# Patient Record
Sex: MALE | Race: WHITE | NOT HISPANIC OR LATINO | Employment: FULL TIME | URBAN - NONMETROPOLITAN AREA
[De-identification: names, ages, dates, MRNs, and addresses within clinical notes are randomized per-mention and may not be internally consistent; named-entity substitution may affect disease eponyms.]

---

## 2019-06-07 ENCOUNTER — OFFICE VISIT (OUTPATIENT)
Dept: INTERNAL MEDICINE | Facility: CLINIC | Age: 29
End: 2019-06-07

## 2019-06-07 VITALS
HEART RATE: 60 BPM | HEIGHT: 69 IN | DIASTOLIC BLOOD PRESSURE: 72 MMHG | WEIGHT: 220 LBS | SYSTOLIC BLOOD PRESSURE: 120 MMHG | BODY MASS INDEX: 32.58 KG/M2 | OXYGEN SATURATION: 98 % | TEMPERATURE: 97.5 F

## 2019-06-07 DIAGNOSIS — R53.82 CHRONIC FATIGUE: ICD-10-CM

## 2019-06-07 DIAGNOSIS — Z22.39 CARRIER OF UREAPLASMA UREALYTICUM: ICD-10-CM

## 2019-06-07 DIAGNOSIS — G47.30 SLEEP APNEA, UNSPECIFIED TYPE: ICD-10-CM

## 2019-06-07 DIAGNOSIS — Z76.89 ENCOUNTER TO ESTABLISH CARE: Primary | ICD-10-CM

## 2019-06-07 PROCEDURE — 99213 OFFICE O/P EST LOW 20 MIN: CPT | Performed by: PHYSICIAN ASSISTANT

## 2019-06-07 RX ORDER — DOXYCYCLINE HYCLATE 100 MG/1
100 CAPSULE ORAL 2 TIMES DAILY
Qty: 14 CAPSULE | Refills: 0 | Status: SHIPPED | OUTPATIENT
Start: 2019-06-07 | End: 2019-06-14

## 2019-06-07 NOTE — PROGRESS NOTES
Subjective   Dutch Mai is a 29 y.o. male who presents to [facility] care.    Chief Complaint   Patient presents with   • [facility]     with Elza today. Pt states he is always tired and his wife was recently diagnosed with Ureaplasma       HPI: Stays fatigued, ongoing for the last 3-4 years.  Works second shift.  Gets anywhere from 7-10 hours of sleep but wakes feeling fatigued.  Sometimes does not feel he sleeps well.  [facility] but he never began using the recommended CPAP.  He cut out soda 5-6 weeks ago and has reduced energy drinks which has not helped.   CBC, B12, vitamin D and thyroid have [facility] without any abnormalities to explain his fatigue. No adenopathy, night sweats or weight loss.     His wife was recently treated for ureaplasma and he is requesting treatment. He denies any burning with urination or penile discharge.         The following portions of the patient's history were reviewed and updated as appropriate: He  has no past medical history on file.  He does not have any pertinent problems on file.  He  has no past surgical history on file.  His family history includes Hypertension in his sister; Skin cancer in his mother.  He  reports that he has quit smoking. He has quit using smokeless tobacco. He reports that he drinks alcohol. He reports that he uses drugs. Drug: Marijuana.    Current Outpatient Medications   Medication Sig Dispense Refill   • doxycycline (VIBRAMYCIN) 100 MG capsule Take 1 capsule by mouth 2 (Two) Times a Day for 7 days. 14 capsule 0     No current facility-administered medications for this visit.        Review of Systems  Review of Systems   Constitutional: Positive for fatigue. Negative for activity change, appetite change, chills, fever and unexpected weight change.        No malaise   HENT: Negative for congestion, ear pain, hearing loss, nosebleeds, postnasal drip, rhinorrhea, sore throat,  "tinnitus, trouble swallowing and voice change.    Eyes: Negative for photophobia, pain, discharge, redness, itching and visual disturbance.        No dry eyes   Respiratory: Positive for apnea. Negative for cough, chest tightness, shortness of breath and wheezing.         No SOB with exertion  No SOB lying down   Cardiovascular: Negative for chest pain, palpitations and leg swelling.        No leg cramps   Gastrointestinal: Negative for abdominal distention, abdominal pain, blood in stool, constipation, diarrhea, nausea and vomiting.        No frequent heartburn  No change in bowel habits   Endocrine: Negative for cold intolerance, heat intolerance, polydipsia, polyphagia and polyuria.        No Muscle weakness  No feeling of weakness  No Hot flashes   Genitourinary: Negative.  Negative for decreased urine volume, difficulty urinating, discharge, dysuria, frequency, hematuria, penile pain, testicular pain and urgency.        No lump on testicles   Musculoskeletal: Negative for arthralgias, back pain, gait problem, joint swelling, myalgias, neck pain and neck stiffness.        No limb pain  No limb swelling   Skin: Negative for color change, pallor, rash and wound.        No rash  No lesions  No Itching  No change in mole   Allergic/Immunologic: Negative for environmental allergies, food allergies and immunocompromised state.   Neurological: Negative for dizziness, tremors, seizures, syncope, weakness, numbness and headaches.        No trouble walking  No confusion  No tingling       Hematological: Negative for adenopathy. Does not bruise/bleed easily.   Psychiatric/Behavioral: Positive for sleep disturbance. Negative for agitation, behavioral problems, confusion, dysphoric mood, hallucinations, self-injury and suicidal ideas. The patient is not nervous/anxious.         No change in personality         Objective    /72   Pulse 60   Temp 97.5 °F (36.4 °C)   Ht 175.3 cm (69\")   Wt 99.8 kg (220 lb)   SpO2 98% "   BMI 32.49 kg/m²   Physical Exam   Constitutional: He is oriented to person, place, and time. He appears well-developed and well-nourished. No distress.   HENT:   Head: Normocephalic and atraumatic.   Right Ear: External ear normal.   Left Ear: External ear normal.   Nose: Nose normal.   Mouth/Throat: No oropharyngeal exudate.   Crowded oropharynx with mildly enlarged right tonsil.    Eyes: Conjunctivae and EOM are normal. Pupils are equal, round, and reactive to light. No scleral icterus.   Neck: Normal range of motion. Neck supple. No thyromegaly present.   Cardiovascular: Normal rate, regular rhythm, normal heart sounds and intact distal pulses. Exam reveals no gallop and no friction rub.   No murmur heard.  Pulmonary/Chest: Effort normal and breath sounds normal. No stridor. No respiratory distress. He has no wheezes. He has no rales. He exhibits no tenderness.   Abdominal: Soft. Bowel sounds are normal. He exhibits no distension and no mass. There is no tenderness. There is no rebound and no guarding. No hernia.   Musculoskeletal: Normal range of motion. He exhibits no tenderness.   Lymphadenopathy:     He has no cervical adenopathy.   Neurological: He is alert and oriented to person, place, and time. He displays normal reflexes. No cranial nerve deficit or sensory deficit.   Skin: Skin is warm and dry. Capillary refill takes less than 2 seconds. No rash noted. He is not diaphoretic. No pallor.   Psychiatric: He has a normal mood and affect. His behavior is normal. Judgment and thought content normal.   Nursing note and vitals reviewed.        Assessment/Plan      Diagnosis Plan   1. Encounter to establish care     2. Chronic fatigue  He declined lab workup today since [facility] are [facility].      3. Carrier of ureaplasma urealyticum  doxycycline (VIBRAMYCIN) 100 MG capsule     4. Sleep apnea, unspecified type  Strongly [facility] for  sleep apnea as this is very likely to be the cause of his chronic fatigue.          Return if symptoms worsen or fail to improve.         AMELIA Malagon  06/07/2019  11:25 AM

## 2019-07-30 ENCOUNTER — OFFICE VISIT (OUTPATIENT)
Dept: INTERNAL MEDICINE | Facility: CLINIC | Age: 29
End: 2019-07-30

## 2019-07-30 VITALS
SYSTOLIC BLOOD PRESSURE: 110 MMHG | HEIGHT: 69 IN | BODY MASS INDEX: 31.84 KG/M2 | HEART RATE: 79 BPM | TEMPERATURE: 98.1 F | WEIGHT: 215 LBS | DIASTOLIC BLOOD PRESSURE: 60 MMHG | OXYGEN SATURATION: 99 %

## 2019-07-30 DIAGNOSIS — F90.8 ADHD, ADULT RESIDUAL TYPE: ICD-10-CM

## 2019-07-30 DIAGNOSIS — M67.431 GANGLION CYST OF WRIST, RIGHT: Primary | ICD-10-CM

## 2019-07-30 PROCEDURE — 99214 OFFICE O/P EST MOD 30 MIN: CPT | Performed by: PHYSICIAN ASSISTANT

## 2019-07-30 NOTE — PATIENT INSTRUCTIONS
Ganglion Cyst  A ganglion cyst is a non-cancerous, fluid-filled lump that occurs near a joint or tendon. The cyst grows out of a joint or the lining of a tendon. Ganglion cysts most often develop in the hand or wrist, but they can also develop in the shoulder, elbow, hip, knee, ankle, or foot.  Ganglion cysts are ball-shaped or egg-shaped. Their size can range from the size of a pea to larger than a grape. Increased activity may cause the cyst to get bigger because more fluid starts to build up.  What are the causes?  The exact cause of this condition is not known, but it may be related to:  · Inflammation or irritation around the joint.  · An injury.  · Repetitive movements or overuse.  · Arthritis.    What increases the risk?  You are more likely to develop this condition if:  · You are a woman.  · You are 15-40 years old.    What are the signs or symptoms?  The main symptom of this condition is a lump. It most often appears on the hand or wrist. In many cases, there are no other symptoms, but a cyst can sometimes cause:  · Tingling.  · Pain.  · Numbness.  · Muscle weakness.  · Weak .  · Less range of motion in a joint.    How is this diagnosed?  Ganglion cysts are usually diagnosed based on a physical exam. Your health care provider will feel the lump and may shine a light next to it. If it is a ganglion cyst, the light will likely shine through it.  Your health care provider may order an X-ray, ultrasound, or MRI to rule out other conditions.  How is this treated?  Ganglion cysts often go away on their own without treatment. If you have pain or other symptoms, treatment may be needed. Treatment is also needed if the ganglion cyst limits your movement or if it gets infected. Treatment may include:  · Wearing a brace or splint on your wrist or finger.  · Taking anti-inflammatory medicine.  · Having fluid drained from the lump with a needle (aspiration).  · Getting a steroid injected into the joint.  · Having  surgery to remove the ganglion cyst.  · Placing a pad on your shoe or wearing shoes that will not rub against the cyst if it is on your foot.    Follow these instructions at home:  · Do not press on the ganglion cyst, poke it with a needle, or hit it.  · Take over-the-counter and prescription medicines only as told by your health care provider.  · If you have a brace or splint:  ? Wear it as told by your health care provider.  ? Remove it as told by your health care provider. Ask if you need to remove it when you take a shower or a bath.  · Watch your ganglion cyst for any changes.  · Keep all follow-up visits as told by your health care provider. This is important.  Contact a health care provider if:  · Your ganglion cyst becomes larger or more painful.  · You have pus coming from the lump.  · You have weakness or numbness in the affected area.  · You have a fever or chills.  Get help right away if:  · You have a fever and have any of these in the cyst area:  ? Increased redness.  ? Red streaks.  ? Swelling.  Summary  · A ganglion cyst is a non-cancerous, fluid-filled lump that occurs near a joint or tendon.  · Ganglion cysts most often develop in the hand or wrist, but they can also develop in the shoulder, elbow, hip, knee, ankle, or foot.  · Ganglion cysts often go away on their own without treatment.  This information is not intended to replace advice given to you by your health care provider. Make sure you discuss any questions you have with your health care provider.  Document Released: 12/15/2001 Document Revised: 08/17/2018 Document Reviewed: 08/17/2018  [employer] Interactive Patient Education © 2019 [employer]

## 2019-07-30 NOTE — PROGRESS NOTES
Dutch Mai is a 29 y.o. male.     Subjective   History of Present Illness   Here today with concern of a bump on the right wrist which he first noticed Saturday when it was larger than it is today. The bump is minimally tender if he applies significant pressure, but otherwise is not bothersome. No appreciable popping sensation.  No known injury.  No previous similar occurrences.     He was prescribed Adderall in high school and feels he would benefit from it again. He has been having difficulty concentrating and staying on task at work. No SI, HI, dysphoric mood or sleep disturbances.           The following portions of the patient's history were reviewed and updated as appropriate: allergies, current medications, past family history, past medical history, past social history, past surgical history and problem list.    Review of Systems   Constitutional: Negative for appetite change, chills, fatigue, fever and unexpected weight change.   Respiratory: Negative for cough, chest tightness, shortness of breath and wheezing.    Cardiovascular: Negative for chest pain, palpitations and leg swelling.   Musculoskeletal: Positive for arthralgias. Negative for back pain, gait problem, joint swelling, myalgias, neck pain and neck stiffness.   Skin: Negative.    Hematological: Negative for adenopathy. Does not bruise/bleed easily.   Psychiatric/Behavioral: Positive for decreased concentration. Negative for agitation, confusion, dysphoric mood, hallucinations, self-injury, sleep disturbance and suicidal ideas. The patient is not nervous/anxious.          Objective    Physical Exam   Constitutional: He is oriented to person, place, and time. He appears well-developed and well-nourished. No distress.   HENT:   Head: Normocephalic and atraumatic.   Eyes: Conjunctivae and EOM are normal. Pupils are equal, round, and reactive to light.   Neck: Normal range of motion. Neck supple.   Cardiovascular: Normal rate, regular rhythm and  "normal heart sounds. Exam reveals no gallop and no friction rub.   No murmur heard.  Pulmonary/Chest: Effort normal and breath sounds normal. No respiratory distress. He has no wheezes. He has no rales.   Abdominal: Soft. Bowel sounds are normal. There is no tenderness.   Musculoskeletal: Normal range of motion. He exhibits deformity (2-3 mm width ganglion cyst of the right wrist). He exhibits no edema or tenderness.        Right wrist: He exhibits deformity. He exhibits normal range of motion, no tenderness, no bony tenderness, no swelling, no effusion, no crepitus and no laceration.        Arms:  Neurological: He is alert and oriented to person, place, and time. He displays normal reflexes. No cranial nerve deficit or sensory deficit. He exhibits normal muscle tone. Coordination normal.   Skin: Skin is warm and dry. Capillary refill takes less than 2 seconds. No rash noted. He is not diaphoretic.   Psychiatric: He has a normal mood and affect. His behavior is normal. Judgment and thought content normal.   Nursing note and vitals reviewed.        /60   Pulse 79   Temp 98.1 °F (36.7 °C)   Ht 175.3 cm (69.02\")   Wt 97.5 kg (215 lb)   SpO2 99%   BMI 31.74 kg/m²     Nursing note and vitals reviewed.        Assessment/Plan   Dutch was seen today for wrist problem.    Diagnoses and all orders for this visit:    Ganglion cyst of wrist, right  Reassurance provided. Discussed and provided written information regarding ganglion cysts. He will call or RTC if the cyst enlarges or becomes bothersome.       ADHD, adult residual type  -     Ambulatory Referral to Psychiatry                 "